# Patient Record
(demographics unavailable — no encounter records)

---

## 2025-04-25 NOTE — PHYSICAL EXAM
[No Acute Distress] : no acute distress [Normocephalic] : normocephalic [Pink Nasal Mucosa] : pink nasal mucosa [Nonerythematous Oropharynx] : nonerythematous oropharynx [No Caries] : no caries [Supple, full passive range of motion] : supple, full passive range of motion [No Palpable Masses] : no palpable masses [Clear to Auscultation Bilaterally] : clear to auscultation bilaterally [Regular Rate and Rhythm] : regular rate and rhythm [No Murmurs] : no murmurs [Soft] : soft [Normoactive Bowel Sounds] : normoactive bowel sounds [Timbo: ____] : Timbo [unfilled] [No Abnormal Lymph Nodes Palpated] : no abnormal lymph nodes palpated [Normal Muscle Tone] : normal muscle tone [No Gait Asymmetry] : no gait asymmetry [Straight] : straight [No Scoliosis] : no scoliosis [Cranial Nerves Grossly Intact] : cranial nerves grossly intact [No Rash or Lesions] : no rash or lesions [FreeTextEntry5] : 20/20  OU [FreeTextEntry3] : PASSED [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

## 2025-04-25 NOTE — HISTORY OF PRESENT ILLNESS
[Mother] : mother [Grade: ____] : Grade: [unfilled] [Normal Performance] : normal performance [Yes] : Patient goes to dentist yearly [Needs Immunizations] : Needs immunizations [Normal] : normal [Days of Bleeding: _____] : Days of bleeding: [unfilled] [Irregular menses] : no irregular menses [Heavy Bleeding] : no heavy bleeding [Painful Cramps] : no painful cramps [Eats meals with family] : eats meals with family [Has family members/adults to turn to for help] : has family members/adults to turn to for help [Sleep Concerns] : no sleep concerns [Eats regular meals including adequate fruits and vegetables] : eats regular meals including adequate fruits and vegetables [Has concerns about body or appearance] : does not have concerns about body or appearance [Has friends] : has friends [At least 1 hour of physical activity a day] : at least 1 hour of physical activity a day [Uses electronic nicotine delivery system] : does not use electronic nicotine delivery system [Uses tobacco] : does not use tobacco [Uses drugs] : does not use drugs  [Drinks alcohol] : does not drink alcohol [Uses safety belts/safety equipment] : uses safety belts/safety equipment  [No] : Patient has not had sexual intercourse. [Has ways to cope with stress] : has ways to cope with stress [Displays self-confidence] : displays self-confidence [Gets depressed, anxious, or irritable/has mood swings] : does not get depressed, anxious, or irritable/has mood swings [Has thought about hurting self or considered suicide] : has not thought about hurting self or considered suicide [With Teen] : teen [FreeTextEntry7] : LAST WELL APRIL 2024 CELIAC NUMBER FLARED  [de-identified] : RECENTLY HAD GI FOLLOW UP CELIAC MARKERS ARE SIGNIFICANTLY ELEVATED DESPITE COMPLIANCE WITH DIETARY RESTRICTIONS.  [de-identified] : NEEDS MEN B [de-identified] : GLUTEN FREE  INCLUDING MAKEUP/HAIR PRODUCTS [de-identified] : WILL BE ATTENDING [facility] NURSING [NO] : No

## 2025-04-25 NOTE — DISCUSSION/SUMMARY
[Normal Growth] : growth [Normal Development] : development  [No Elimination Concerns] : elimination [No Skin Concerns] : skin [Normal Sleep Pattern] : sleep [Physical Growth and Development] : physical growth and development [Social and Academic Competence] : social and academic competence [Emotional Well-Being] : emotional well-being [Risk Reduction] : risk reduction [Violence and Injury Prevention] : violence and injury prevention [No Medication Changes] : no medication changes [Patient] : patient [Mother] : mother [Full Activity without restrictions including Physical Education & Athletics] : Full Activity without restrictions including Physical Education & Athletics [I have examined the above-named student and completed the preparticipation physical evaluation. The athlete does not present apparent clinical contraindications to practice and participate in sport(s) as outlined above. A copy of the physical exam is on r] : I have examined the above-named student and completed the preparticipation physical evaluation. The athlete does not present apparent clinical contraindications to practice and participate in sport(s) as outlined above. A copy of the physical exam is on record in my office and can be made available to the school at the request of the parents. If conditions arise after the athlete has been cleared for participation, the physician may rescind the clearance until the problem is resolved and the potential consequences are completely explained to the athlete (and parents/guardians). [de-identified] : AT A HEALTHY WEIGHT [FreeTextEntry4] : DISCUSSED COLLEGE PLANS  [FreeTextEntry6] : MEN B  [FreeTextEntry7] : CHANGES PER GI [FreeTextEntry1] : LABS SENT, CELIAC MARKERS REPEATED ? SPURIOUS RESULT [de-identified] : GI FOLLOW UP [de-identified] : MEN B BOOSTER IN 1 MONTH, WELL YEARLY  [] : The components of the vaccine(s) to be administered today are listed in the plan of care. The disease(s) for which the vaccine(s) are intended to prevent and the risks have been discussed with the caretaker.  The risks are also included in the appropriate vaccination information statements which have been provided to the patient's caregiver.  The caregiver has given consent to vaccinate. [Met privately with the adolescent for part of the office visit?] : Met privately with the adolescent for part of the office visit? Yes [Adolescent demonstrates understanding of his/her conditions and how to take prescribed medications?] : Adolescent demonstrates understanding of his/her conditions and how to take prescribed medications? Yes [Initiated discussion about transfer to an adult healthcare provider?] : Initiated discussion about transfer to an adult healthcare provider? Yes

## 2025-04-25 NOTE — CURRENT MEDS
[] : missed dose(s) of medications. Reason(s): [Forgot to take it] : forgot to take it [Patient/caregiver able to verbalize understanding of medications, including indications and side effects] : Patient/caregiver able to verbalize understanding of medications, including indications and side effects [de-identified] : MISSED BUDESONIDE DOSING SOMETIMES

## 2025-04-25 NOTE — RISK ASSESSMENT
[1] : 1) Little interest or pleasure doing things for several days (1) [0] : 2) Feeling down, depressed, or hopeless: Not at all (0) [PHQ-9 Negative - No further assessment needed] : PHQ-9 Negative - No further assessment needed